# Patient Record
Sex: MALE | Race: WHITE | Employment: OTHER | URBAN - METROPOLITAN AREA
[De-identification: names, ages, dates, MRNs, and addresses within clinical notes are randomized per-mention and may not be internally consistent; named-entity substitution may affect disease eponyms.]

---

## 2020-11-24 ENCOUNTER — OFFICE VISIT (OUTPATIENT)
Dept: FAMILY MEDICINE CLINIC | Facility: CLINIC | Age: 70
End: 2020-11-24
Payer: MEDICARE

## 2020-11-24 VITALS
TEMPERATURE: 97 F | HEIGHT: 78 IN | WEIGHT: 290 LBS | BODY MASS INDEX: 33.55 KG/M2 | DIASTOLIC BLOOD PRESSURE: 74 MMHG | HEART RATE: 95 BPM | OXYGEN SATURATION: 90 % | SYSTOLIC BLOOD PRESSURE: 114 MMHG | RESPIRATION RATE: 16 BRPM

## 2020-11-24 DIAGNOSIS — R06.02 SHORTNESS OF BREATH: Primary | ICD-10-CM

## 2020-11-24 PROCEDURE — 99202 OFFICE O/P NEW SF 15 MIN: CPT | Performed by: NURSE PRACTITIONER

## 2020-11-24 RX ORDER — POTASSIUM CHLORIDE 750 MG/1
20 TABLET, FILM COATED, EXTENDED RELEASE ORAL DAILY
COMMUNITY
Start: 2020-01-14

## 2020-11-24 RX ORDER — ATORVASTATIN CALCIUM 20 MG/1
20 TABLET, FILM COATED ORAL DAILY
COMMUNITY
Start: 2020-10-19

## 2020-11-24 RX ORDER — FAMOTIDINE 20 MG/1
20 TABLET ORAL 2 TIMES DAILY
COMMUNITY
Start: 2019-08-23

## 2020-11-24 RX ORDER — METOPROLOL TARTRATE 50 MG/1
50 TABLET, FILM COATED ORAL 2 TIMES DAILY
COMMUNITY
Start: 2020-04-13

## 2020-11-24 RX ORDER — LOSARTAN POTASSIUM 25 MG/1
25 TABLET ORAL 2 TIMES DAILY
COMMUNITY
Start: 2020-01-10

## 2020-11-24 RX ORDER — INSULIN LISPRO 100 [IU]/ML
INJECTION, SOLUTION INTRAVENOUS; SUBCUTANEOUS
COMMUNITY
Start: 2018-08-20

## 2020-11-24 RX ORDER — EXENATIDE 2 MG/.65ML
INJECTION, SUSPENSION, EXTENDED RELEASE SUBCUTANEOUS
COMMUNITY
Start: 2020-06-01

## 2020-11-24 RX ORDER — FINASTERIDE 5 MG/1
5 TABLET, FILM COATED ORAL DAILY
COMMUNITY
Start: 2020-06-24

## 2020-11-24 RX ORDER — FUROSEMIDE 40 MG/1
40 TABLET ORAL DAILY
COMMUNITY
Start: 2020-04-13

## 2020-11-24 RX ORDER — ASPIRIN 81 MG/1
81 TABLET ORAL DAILY
COMMUNITY

## 2020-11-24 NOTE — PROGRESS NOTES
CHIEF COMPLAINT:   Patient presents with:  Shortness Of Breath: fatigue/chills x 1 week      HPI:   Juwan Haynes is a 65year old male who presents with his daughter for shortness of breath, cough, and fatigue Pt's daughter notes he has been more fa SYSTEMS:   GENERAL: Denies fevers. Notes decreased appetite  SKIN: no rashes or abnormal skin lesions  HEENT: Denies sore throat, sinus symptoms,  ear pain  LUNGS: + nonproductive cough, + shortness of breath.  Denies wheezing  CARDIOVASCULAR: denies chest verbalized understanding and notes they will go to the [facility] ED. They declined medical transport. We discussed the risks of not going by medical transport including worsening condition, permanent injury and/or death.  They verbalized understanding an shesign